# Patient Record
Sex: FEMALE | Race: OTHER | Employment: UNEMPLOYED | URBAN - METROPOLITAN AREA
[De-identification: names, ages, dates, MRNs, and addresses within clinical notes are randomized per-mention and may not be internally consistent; named-entity substitution may affect disease eponyms.]

---

## 2017-10-25 ENCOUNTER — OFFICE VISIT (OUTPATIENT)
Dept: INTERNAL MEDICINE CLINIC | Age: 27
End: 2017-10-25

## 2017-10-25 VITALS
BODY MASS INDEX: 23.55 KG/M2 | RESPIRATION RATE: 20 BRPM | WEIGHT: 128 LBS | HEART RATE: 65 BPM | OXYGEN SATURATION: 100 % | SYSTOLIC BLOOD PRESSURE: 104 MMHG | DIASTOLIC BLOOD PRESSURE: 71 MMHG | HEIGHT: 62 IN

## 2017-10-25 DIAGNOSIS — M54.50 ACUTE LEFT-SIDED LOW BACK PAIN WITHOUT SCIATICA: Primary | ICD-10-CM

## 2017-10-25 DIAGNOSIS — Z76.89 ENCOUNTER TO ESTABLISH CARE WITH NEW DOCTOR: ICD-10-CM

## 2017-10-25 PROBLEM — G89.29 CHRONIC RIGHT-SIDED LOW BACK PAIN WITHOUT SCIATICA: Status: RESOLVED | Noted: 2017-10-25 | Resolved: 2017-10-25

## 2017-10-25 PROBLEM — G89.29 CHRONIC RIGHT-SIDED LOW BACK PAIN WITHOUT SCIATICA: Status: ACTIVE | Noted: 2017-10-25

## 2017-10-25 PROCEDURE — 1036F TOBACCO NON-USER: CPT | Performed by: NURSE PRACTITIONER

## 2017-10-25 PROCEDURE — G8427 DOCREV CUR MEDS BY ELIG CLIN: HCPCS | Performed by: NURSE PRACTITIONER

## 2017-10-25 PROCEDURE — G8484 FLU IMMUNIZE NO ADMIN: HCPCS | Performed by: NURSE PRACTITIONER

## 2017-10-25 PROCEDURE — 99204 OFFICE O/P NEW MOD 45 MIN: CPT | Performed by: NURSE PRACTITIONER

## 2017-10-25 PROCEDURE — G8420 CALC BMI NORM PARAMETERS: HCPCS | Performed by: NURSE PRACTITIONER

## 2017-10-25 RX ORDER — CYCLOBENZAPRINE HCL 5 MG
5 TABLET ORAL 2 TIMES DAILY PRN
Qty: 10 TABLET | Refills: 0 | Status: SHIPPED | OUTPATIENT
Start: 2017-10-25 | End: 2017-11-04

## 2017-10-25 RX ORDER — NAPROXEN 500 MG/1
500 TABLET ORAL 2 TIMES DAILY WITH MEALS
Qty: 30 TABLET | Refills: 0 | Status: SHIPPED | OUTPATIENT
Start: 2017-10-25 | End: 2017-10-27 | Stop reason: ALTCHOICE

## 2017-10-25 ASSESSMENT — ENCOUNTER SYMPTOMS
BACK PAIN: 1
COUGH: 0
VOMITING: 0
NAUSEA: 0
DIARRHEA: 0
CONSTIPATION: 0

## 2017-10-25 ASSESSMENT — PATIENT HEALTH QUESTIONNAIRE - PHQ9
SUM OF ALL RESPONSES TO PHQ9 QUESTIONS 1 & 2: 0
SUM OF ALL RESPONSES TO PHQ QUESTIONS 1-9: 0
1. LITTLE INTEREST OR PLEASURE IN DOING THINGS: 0
2. FEELING DOWN, DEPRESSED OR HOPELESS: 0

## 2017-10-25 NOTE — PATIENT INSTRUCTIONS
pulling your knee to your chest.  Curl-ups    1. Lie on the floor on your back with your knees bent at a 90-degree angle. Your feet should be flat on the floor, about 12 inches from your buttocks. 2. Cross your arms over your chest. If this bothers your neck, try putting your hands behind your neck (not your head), with your elbows spread apart. 3. Slowly tighten your belly muscles and raise your shoulder blades off the floor. 4. Keep your head in line with your body, and do not press your chin to your chest.  5. Hold this position for 1 or 2 seconds, then slowly lower yourself back down to the floor. 6. Repeat 8 to 12 times. Pelvic tilt exercise    1. Lie on your back with your knees bent. 2. \"Brace\" your stomach. This means to tighten your muscles by pulling in and imagining your belly button moving toward your spine. You should feel like your back is pressing to the floor and your hips and pelvis are rocking back. 3. Hold for about 6 seconds while you breathe smoothly. 4. Repeat 8 to 12 times. Heel dig bridging    1. Lie on your back with both knees bent and your ankles bent so that only your heels are digging into the floor. Your knees should be bent about 90 degrees. 2. Then push your heels into the floor, squeeze your buttocks, and lift your hips off the floor until your shoulders, hips, and knees are all in a straight line. 3. Hold for about 6 seconds as you continue to breathe normally, and then slowly lower your hips back down to the floor and rest for up to 10 seconds. 4. Do 8 to 12 repetitions. Hamstring stretch in doorway    1. Lie on your back in a doorway, with one leg through the open door. 2. Slide your leg up the wall to straighten your knee. You should feel a gentle stretch down the back of your leg. 3. Hold the stretch for at least 15 to 30 seconds. Do not arch your back, point your toes, or bend either knee.  Keep one heel touching the floor and the other heel touching the wall.  4. Repeat with your other leg. 5. Do 2 to 4 times for each leg. Hip flexor stretch    1. Kneel on the floor with one knee bent and one leg behind you. Place your forward knee over your foot. Keep your other knee touching the floor. 2. Slowly push your hips forward until you feel a stretch in the upper thigh of your rear leg. 3. Hold the stretch for at least 15 to 30 seconds. Repeat with your other leg. 4. Do 2 to 4 times on each side. Wall sit    1. Stand with your back 10 to 12 inches away from a wall. 2. Lean into the wall until your back is flat against it. 3. Slowly slide down until your knees are slightly bent, pressing your lower back into the wall. 4. Hold for about 6 seconds, then slide back up the wall. 5. Repeat 8 to 12 times. Follow-up care is a key part of your treatment and safety. Be sure to make and go to all appointments, and call your doctor if you are having problems. It's also a good idea to know your test results and keep a list of the medicines you take. Where can you learn more? Go to https://[employer]. org and sign in to your [employer] account. Enter K400 in the [facility] box to learn more about \"Low Back Pain: Exercises. \"     If you do not have an account, please click on the \"Sign Up Now\" link. Current as of: March 21, 2017  Content Version: 11.3  © 0017-8481 [employer]. Care instructions adapted under license by [facility] ([facility]). If you have questions about a medical condition or this instruction, always ask your healthcare professional. [place] [address] any warranty or liability for your use of this information.

## 2017-10-25 NOTE — PROGRESS NOTES
1341   BP: 104/71   Pulse: 65   Resp: 20   SpO2: 100%   Weight: 128 lb (58.1 kg)   Height: 5' 2\" (1.575 m)       BP Readings from Last 3 Encounters:   10/25/17 104/71   10/22/17 108/74   08/26/17 118/73          Physical Exam   HENT:   Mouth/Throat: Oropharynx is clear and moist.   Eyes: Conjunctivae are normal.   Neck: Normal range of motion. Cardiovascular: Normal rate, regular rhythm and normal heart sounds. Pulmonary/Chest: Effort normal and breath sounds normal.   Abdominal: Soft. Bowel sounds are normal.   Musculoskeletal: Normal range of motion. She exhibits no deformity. Back:    Neurological: She is alert. Skin: Skin is warm. ASSESSMENT/PLAN:    1. Acute left-sided low back pain without sciatica  Referral for PT:  - [facility]  - naproxen (NAPROSYN) 500 MG tablet; Take 1 tablet by mouth 2 times daily (with meals) As needed for pain  Dispense: 30 tablet; Refill: 0  - cyclobenzaprine (FLEXERIL) 5 MG tablet; Take 1 tablet by mouth 2 times daily as needed for Muscle spasms  Dispense: 10 tablet; Refill: 0    2.  Encounter to establish care with new doctor      RTC in 6 months

## 2017-10-27 ENCOUNTER — OFFICE VISIT (OUTPATIENT)
Dept: INTERNAL MEDICINE CLINIC | Age: 27
End: 2017-10-27

## 2017-10-27 VITALS
RESPIRATION RATE: 20 BRPM | DIASTOLIC BLOOD PRESSURE: 72 MMHG | HEART RATE: 74 BPM | HEIGHT: 60 IN | SYSTOLIC BLOOD PRESSURE: 110 MMHG | BODY MASS INDEX: 24.15 KG/M2 | WEIGHT: 123 LBS

## 2017-10-27 DIAGNOSIS — M54.50 ACUTE LEFT-SIDED LOW BACK PAIN WITHOUT SCIATICA: Primary | ICD-10-CM

## 2017-10-27 PROCEDURE — 1036F TOBACCO NON-USER: CPT | Performed by: NURSE PRACTITIONER

## 2017-10-27 PROCEDURE — 99214 OFFICE O/P EST MOD 30 MIN: CPT | Performed by: NURSE PRACTITIONER

## 2017-10-27 PROCEDURE — G8427 DOCREV CUR MEDS BY ELIG CLIN: HCPCS | Performed by: NURSE PRACTITIONER

## 2017-10-27 PROCEDURE — G8420 CALC BMI NORM PARAMETERS: HCPCS | Performed by: NURSE PRACTITIONER

## 2017-10-27 PROCEDURE — G8484 FLU IMMUNIZE NO ADMIN: HCPCS | Performed by: NURSE PRACTITIONER

## 2017-10-27 RX ORDER — IBUPROFEN 600 MG/1
600 TABLET ORAL EVERY 8 HOURS PRN
Qty: 50 TABLET | Refills: 0 | Status: SHIPPED | OUTPATIENT
Start: 2017-10-27

## 2017-10-27 RX ORDER — TRAMADOL HYDROCHLORIDE 50 MG/1
50 TABLET ORAL EVERY 8 HOURS PRN
Qty: 20 TABLET | Refills: 0 | Status: SHIPPED | OUTPATIENT
Start: 2017-10-27 | End: 2017-11-06

## 2017-10-27 ASSESSMENT — ENCOUNTER SYMPTOMS: BACK PAIN: 1

## 2017-10-27 NOTE — PATIENT INSTRUCTIONS
Patient Education        Back Pain: Care Instructions  Your Care Instructions    Back pain has many possible causes. It is often related to problems with muscles and ligaments of the back. It may also be related to problems with the nerves, discs, or bones of the back. Moving, lifting, standing, sitting, or sleeping in an awkward way can strain the back. Sometimes you don't notice the injury until later. Arthritis is another common cause of back pain. Although it may hurt a lot, back pain usually improves on its own within several weeks. Most people recover in 12 weeks or less. Using good home treatment and being careful not to stress your back can help you feel better sooner. Follow-up care is a key part of your treatment and safety. Be sure to make and go to all appointments, and call your doctor if you are having problems. Its also a good idea to know your test results and keep a list of the medicines you take. How can you care for yourself at home? · Sit or lie in positions that are most comfortable and reduce your pain. Try one of these positions when you lie down:  ¨ Lie on your back with your knees bent and supported by large pillows. ¨ Lie on the floor with your legs on the seat of a sofa or chair. Kay Donath on your side with your knees and hips bent and a pillow between your legs. ¨ Lie on your stomach if it does not make pain worse. · Do not sit up in bed, and avoid soft couches and twisted positions. Bed rest can help relieve pain at first, but it delays healing. Avoid bed rest after the first day of back pain. · Change positions every 30 minutes. If you must sit for long periods of time, take breaks from sitting. Get up and walk around, or lie in a comfortable position. · Try using a heating pad on a low or medium setting for 15 to 20 minutes every 2 or 3 hours. Try a warm shower in place of one session with the heating pad.   · You can also try an ice pack for 10 to 15 minutes every 2 to 3 hours. Put a thin cloth between the ice pack and your skin. · Take pain medicines exactly as directed. ¨ If the doctor gave you a prescription medicine for pain, take it as prescribed. ¨ If you are not taking a prescription pain medicine, ask your doctor if you can take an over-the-counter medicine. · Take short walks several times a day. You can start with 5 to 10 minutes, 3 or 4 times a day, and work up to longer walks. Walk on level surfaces and avoid hills and stairs until your back is better. · Return to work and other activities as soon as you can. Continued rest without activity is usually not good for your back. · To prevent future back pain, do exercises to stretch and strengthen your back and stomach. Learn how to use good posture, safe lifting techniques, and proper body mechanics. When should you call for help? Call your doctor now or seek immediate medical care if:  · You have new or worsening numbness in your legs. · You have new or worsening weakness in your legs. (This could make it hard to stand up.)  · You lose control of your bladder or bowels. Watch closely for changes in your health, and be sure to contact your doctor if:  · Your pain gets worse. · You are not getting better after 2 weeks. Where can you learn more? Go to https://[employer]. org and sign in to your [employer] account. Enter W632 in the [employer] box to learn more about \"Back Pain: Care Instructions. \"     If you do not have an account, please click on the \"Sign Up Now\" link. Current as of: March 21, 2017  Content Version: 11.3  © 4674-1384 [employer]. Care instructions adapted under license by [facility] ([facility]). If you have questions about a medical condition or this instruction, always ask your healthcare professional. [address] any warranty or liability for your use of this information.

## 2017-10-27 NOTE — PROGRESS NOTES
10/27/17    Chief Complaint   Patient presents with    Back Pain       HPI:   Jaxon Fernando is a 24 y.o. female is here today for c/o worsening back pain. Reports Naproxen is making her nauseated and sleepy. Also has forms to be completed for work re: physical limitations due to back pain. Patient Active Problem List   Diagnosis    Encounter to establish care with new doctor    Acute left-sided low back pain without sciatica       No past medical history on file. Family History   Problem Relation Age of Onset    Stroke Mother     High Blood Pressure Mother     Arthritis Mother     Diabetes Maternal Grandmother        No Known Allergies    Current Outpatient Prescriptions   Medication Sig Dispense Refill    ibuprofen (ADVIL;MOTRIN) 600 MG tablet Take 1 tablet by mouth every 8 hours as needed for Pain 50 tablet 0    traMADol (ULTRAM) 50 MG tablet Take 1 tablet by mouth every 8 hours as needed for Pain 20 tablet 0    cyclobenzaprine (FLEXERIL) 5 MG tablet Take 1 tablet by mouth 2 times daily as needed for Muscle spasms 10 tablet 0    levonorgestrel-ethinyl estradiol (AVIANE;ALESSE;LESSINA) 0.1-20 MG-MCG per tablet Take by mouth       No current facility-administered medications for this visit. Review of Systems   Cardiovascular: Negative for chest pain. Musculoskeletal: Positive for back pain (limited ROM) and gait problem (due to back pain). Negative for joint swelling. Neurological: Negative for headaches. Vitals:    10/27/17 0936   BP: 110/72   Pulse: 74   Resp: 20   Weight: 123 lb (55.8 kg)   Height: 5' (1.524 m)       BP Readings from Last 3 Encounters:   10/27/17 110/72   10/25/17 104/71   10/22/17 108/74          Physical Exam   Cardiovascular: Normal rate, regular rhythm and normal heart sounds. Pulmonary/Chest: Effort normal and breath sounds normal.   Musculoskeletal:        Lumbar back: She exhibits tenderness and pain. Back:        ASSESSMENT/PLAN:    1.

## 2017-11-07 ENCOUNTER — HOSPITAL ENCOUNTER (OUTPATIENT)
Dept: OTHER | Age: 27
Discharge: HOME | End: 2017-11-30
Attending: NURSE PRACTITIONER | Admitting: NURSE PRACTITIONER

## 2017-11-07 NOTE — FLOWSHEET NOTE
Physical Therapy Daily Treatment Note  Date:  2017    Patient Name:  Tea Arredondo    :  1990  MRN: [id number]  Restrictions/Precautions:    Pertinent Medical History:  Medical/Treatment Diagnosis Information:  · Diagnosis: Left sided low back pain without sciatica  · Treatment Diagnosis: Low back pain that is affecting ADLs  Insurance/Certification information:  PT Insurance Information: [facility] health plan  Physician Information:  Referring Practitioner: Dr. Steven Garnica of care signed (Y/N):    Visit# / total visits:    Pain level: /10     G-Code (if applicable):      Date / Visit # G-Code Applied:  17  PT G-Codes  Functional Assessment Tool Used: Oswestry  Score: 14  Functional Limitation: Mobility: Walking and moving around  Mobility: Walking and Moving Around Current Status (): At least 20 percent but less than 40 percent impaired, limited or restricted  Mobility: Walking and Moving Around Goal Status (): 0 percent impaired, limited or restricted    Progress Note: []  Yes  []  No  Next due by: Visit #10      History of Injury:  Pt states she started having back pain, randomly, no certain reason. States she works as [employer] and does have to stand on her feet for long periods and then has to move a lot of small bags 25#. Pt states on , she started having really bad back pain one day and went to the ER. States in the evening after work she had so much pain she couldn't walk . Pain was in her left low back only. Center left low back. Denies any radiating pain in her LE at that time or currently. Pt states she tried to work the next week but could only stand for 4 hours cause of the pain. She is now off work untill 17. Pain is currently 0/10 unless she does something to aggravate it.   Pt has trouble standing or walking more than a few hours     Subjective:       Objective:  Observation:   Test measurements: Exercises:  Exercise/Equipment Resistance/Repetitions Other comments                                                 HEP     PPT and PPT with SLR     SKTC, Piriformis, and HS stretch                 Other Therapeutic Activities:  Discussed purpose, risks and benefits of PT with pt who is agreeable to POC. Home Exercise Program:    Instructed patient on an HEP. Patient demonstrated exercises correctly. Handout with pictures and # of reps/sets was given. Exercises are above. Manual Treatments:    STM to left lumbar paraspinals  L SKTC and Piriformis stretch  Left hip distr  Hip Abd/ADD isometrics on left.     Modalities:    MHP to lumbar spine    Timed Code Treatment Minutes:  40    Total Treatment Minutes:  70    Treatment/Activity Tolerance:  [x] Patient tolerated treatment well [] Patient limited by fatigue  [] Patient limited by pain  [] Patient limited by other medical complications  [] Other:     Prognosis: [x] Good [] Fair  [] Poor    Patient Requires Follow-up: [x] Yes  [] No    Plan:   [] Continue per plan of care [] Alter current plan (see comments)  [x] Plan of care initiated [] Hold pending MD visit [] Discharge  Plan for Next Session:    Manual to lumbar spine above  Add core strengthening as tolerated  Modalities: HP, US, Estim, ktape as needed    Electronically signed by:  Benton Watt, PT

## 2017-11-07 NOTE — PROGRESS NOTES
Oswestry Disability Index 2.0    Patient: Navi Holley  : 1990  MRN: [id number]  Date: 2017  Electronically Signed by: Christine Costello     Please Read: Could you please complete this questionnaire. It is designed to give us information as to how your back (or leg) trouble has affected your ability to manage in everyday life. Please answer every section. Mehdi one box only in each section that most closely describes you today  SECTION 1 - Pain Intensity  []A. I have no pain at the moment  []B. The pain is very mild at the moment  [x]C. The pain is moderate at the moment  []D. The pain is fairly severe at the moment  []E. The pain is very severe at the moment  []F. The pain is the worst imaginable at the moment SECTION 6 - Standing   []A. I can stand as long as I want without extra pain  []B. I can stand as long as I want but it gives me extra pain  [x]C. Pain prevents me from standing for more than 1 hour  []D. Pain prevents me from standing for more than 1/2 hour  []E. Pain prevents me from standing more than 10 minutes  []F. Pain prevents me from standing at all   SECTION 2 - Personal Care Be Devries, etc.)  [x]A. I can look after myself normally without causing extra pain  []B. I can look after myself normally, but it causes me extra pain  []C. It is painful to look after myself and I am slow and careful  []D. I need some help, but manage most of my personal care  []E. I need help every day in most aspects of personal care  []F. I do not get dressed, I wash with difficulty and stay in bed SECTION 7 - Sleeping  [x]A. My sleep is never disturbed by pain   []B. My sleep is occasionally disturbed by pain  []C. Because of pain I have less than 6 hours sleep   []D. Because of pain I have less than 4 hours sleep   []E. Because of pain I have less than 2 hours sleep   []F. Pain prevents me from sleeping at all     SECTION 3 - Lifting  []A. I can lift heavy weights without extra pain  []B.
goal 1: Pt will report 0/10 pain in left low back with standing >6 hrs and bending/squatting so pt can return to work.   Patient Goals   Patient goals : \"fix my lower back issue\"       Therapy Time   Individual Concurrent Group Co-treatment   Time In           Time Out           Minutes                   Zena min, PT

## 2017-12-01 ENCOUNTER — HOSPITAL ENCOUNTER (OUTPATIENT)
Dept: OTHER | Age: 27
Discharge: HOME | End: 2017-12-31
Attending: NURSE PRACTITIONER | Admitting: NURSE PRACTITIONER

## 2019-03-10 ENCOUNTER — HOSPITAL ENCOUNTER (EMERGENCY)
Age: 29
Discharge: HOME | End: 2019-03-10
Payer: COMMERCIAL

## 2019-03-10 VITALS
BODY MASS INDEX: 25.32 KG/M2 | HEART RATE: 115 BPM | WEIGHT: 137.57 LBS | OXYGEN SATURATION: 99 % | TEMPERATURE: 99.7 F | HEIGHT: 62 IN | DIASTOLIC BLOOD PRESSURE: 70 MMHG | SYSTOLIC BLOOD PRESSURE: 109 MMHG | RESPIRATION RATE: 16 BRPM

## 2019-03-10 DIAGNOSIS — M54.50 BILATERAL LOW BACK PAIN WITHOUT SCIATICA, UNSPECIFIED CHRONICITY: ICD-10-CM

## 2019-03-10 DIAGNOSIS — J02.0 STREPTOCOCCAL SORE THROAT: Primary | ICD-10-CM

## 2019-03-10 LAB
BILIRUBIN URINE: NEGATIVE
BLOOD, URINE: NEGATIVE
CLARITY: CLEAR
COLOR: YELLOW
GLUCOSE URINE: NEGATIVE MG/DL
HCG(URINE) PREGNANCY TEST: NEGATIVE
KETONES, URINE: 40 MG/DL
LEUKOCYTE ESTERASE, URINE: NEGATIVE
MICROSCOPIC EXAMINATION: ABNORMAL
NITRITE, URINE: NEGATIVE
PH UA: 8.5 (ref 5–8)
PROTEIN UA: NEGATIVE MG/DL
RAPID INFLUENZA  B AGN: NEGATIVE
RAPID INFLUENZA A AGN: NEGATIVE
S PYO AG THROAT QL: POSITIVE
SPECIFIC GRAVITY UA: 1.02 (ref 1–1.03)
URINE REFLEX TO CULTURE: ABNORMAL
URINE TYPE: ABNORMAL
UROBILINOGEN, URINE: 1 E.U./DL

## 2019-03-10 PROCEDURE — 84703 CHORIONIC GONADOTROPIN ASSAY: CPT

## 2019-03-10 PROCEDURE — 87804 INFLUENZA ASSAY W/OPTIC: CPT

## 2019-03-10 PROCEDURE — 6360000002 HC RX W HCPCS: Performed by: PHYSICIAN ASSISTANT

## 2019-03-10 PROCEDURE — 6370000000 HC RX 637 (ALT 250 FOR IP): Performed by: PHYSICIAN ASSISTANT

## 2019-03-10 PROCEDURE — 87880 STREP A ASSAY W/OPTIC: CPT

## 2019-03-10 PROCEDURE — 99283 EMERGENCY DEPT VISIT LOW MDM: CPT

## 2019-03-10 PROCEDURE — 81003 URINALYSIS AUTO W/O SCOPE: CPT

## 2019-03-10 RX ORDER — NAPROXEN 500 MG/1
500 TABLET ORAL 2 TIMES DAILY WITH MEALS
Qty: 20 TABLET | Refills: 0 | Status: SHIPPED | OUTPATIENT
Start: 2019-03-10 | End: 2019-03-20

## 2019-03-10 RX ORDER — DEXAMETHASONE 4 MG/1
8 TABLET ORAL ONCE
Status: COMPLETED | OUTPATIENT
Start: 2019-03-10 | End: 2019-03-10

## 2019-03-10 RX ORDER — ACETAMINOPHEN 325 MG/1
650 TABLET ORAL ONCE
Status: COMPLETED | OUTPATIENT
Start: 2019-03-10 | End: 2019-03-10

## 2019-03-10 RX ORDER — PENICILLIN V POTASSIUM 500 MG/1
500 TABLET ORAL 4 TIMES DAILY
Qty: 40 TABLET | Refills: 0 | Status: SHIPPED | OUTPATIENT
Start: 2019-03-10 | End: 2019-03-20

## 2019-03-10 RX ADMIN — DEXAMETHASONE 8 MG: 4 TABLET ORAL at 18:44

## 2019-03-10 RX ADMIN — IBUPROFEN 600 MG: 400 TABLET ORAL at 18:43

## 2019-03-10 RX ADMIN — ACETAMINOPHEN 650 MG: 325 TABLET, FILM COATED ORAL at 18:43

## 2019-03-10 ASSESSMENT — PAIN SCALES - GENERAL
PAINLEVEL_OUTOF10: 1
PAINLEVEL_OUTOF10: 8
PAINLEVEL_OUTOF10: 5
PAINLEVEL_OUTOF10: 1

## 2019-03-10 ASSESSMENT — ENCOUNTER SYMPTOMS
COUGH: 0
VOMITING: 0
SORE THROAT: 1
BACK PAIN: 1
ABDOMINAL PAIN: 0
VOICE CHANGE: 0
NAUSEA: 0
SHORTNESS OF BREATH: 0
COLOR CHANGE: 0
TROUBLE SWALLOWING: 0

## 2019-03-10 ASSESSMENT — PAIN DESCRIPTION - DESCRIPTORS: DESCRIPTORS: ACHING

## 2019-03-10 ASSESSMENT — PAIN DESCRIPTION - LOCATION: LOCATION: BACK

## 2019-03-10 ASSESSMENT — PAIN DESCRIPTION - PAIN TYPE: TYPE: ACUTE PAIN
